# Patient Record
Sex: FEMALE | Race: WHITE | NOT HISPANIC OR LATINO | URBAN - METROPOLITAN AREA
[De-identification: names, ages, dates, MRNs, and addresses within clinical notes are randomized per-mention and may not be internally consistent; named-entity substitution may affect disease eponyms.]

---

## 2024-06-24 ENCOUNTER — APPOINTMENT (RX ONLY)
Dept: URBAN - METROPOLITAN AREA CLINIC 333 | Facility: CLINIC | Age: 54
Setting detail: DERMATOLOGY
End: 2024-06-24

## 2024-06-24 DIAGNOSIS — L82.0 INFLAMED SEBORRHEIC KERATOSIS: ICD-10-CM

## 2024-06-24 PROBLEM — D48.5 NEOPLASM OF UNCERTAIN BEHAVIOR OF SKIN: Status: ACTIVE | Noted: 2024-06-24

## 2024-06-24 PROCEDURE — ? SHAVE REMOVAL

## 2024-06-24 PROCEDURE — 11311 SHAVE SKIN LESION 0.6-1.0 CM: CPT

## 2024-06-24 ASSESSMENT — LOCATION ZONE DERM: LOCATION ZONE: FACE

## 2024-06-24 ASSESSMENT — LOCATION SIMPLE DESCRIPTION DERM: LOCATION SIMPLE: RIGHT CHEEK

## 2024-06-24 ASSESSMENT — LOCATION DETAILED DESCRIPTION DERM: LOCATION DETAILED: RIGHT INFERIOR CENTRAL MALAR CHEEK

## 2024-06-24 NOTE — HPI: SKIN LESION
How Severe Is Your Skin Lesion?: mild
[id number]: treated_been_treated
Is This A New Presentation, Or A Follow-Up?: Skin Lesion